# Patient Record
Sex: FEMALE | HISPANIC OR LATINO | Employment: FULL TIME | URBAN - METROPOLITAN AREA
[De-identification: names, ages, dates, MRNs, and addresses within clinical notes are randomized per-mention and may not be internally consistent; named-entity substitution may affect disease eponyms.]

---

## 2017-07-24 ENCOUNTER — COMMUNICATION - HEALTHEAST (OUTPATIENT)
Dept: FAMILY MEDICINE | Facility: CLINIC | Age: 52
End: 2017-07-24

## 2021-05-25 ENCOUNTER — RECORDS - HEALTHEAST (OUTPATIENT)
Dept: ADMINISTRATIVE | Facility: CLINIC | Age: 56
End: 2021-05-25

## 2022-11-01 ENCOUNTER — HOSPITAL ENCOUNTER (EMERGENCY)
Facility: CLINIC | Age: 57
Discharge: HOME | End: 2022-11-01
Attending: EMERGENCY MEDICINE | Admitting: EMERGENCY MEDICINE
Payer: COMMERCIAL

## 2022-11-01 ENCOUNTER — APPOINTMENT (OUTPATIENT)
Dept: MRI IMAGING | Facility: CLINIC | Age: 57
End: 2022-11-01
Attending: EMERGENCY MEDICINE
Payer: COMMERCIAL

## 2022-11-01 VITALS
TEMPERATURE: 97.2 F | DIASTOLIC BLOOD PRESSURE: 66 MMHG | HEART RATE: 78 BPM | OXYGEN SATURATION: 97 % | RESPIRATION RATE: 16 BRPM | BODY MASS INDEX: 29.95 KG/M2 | WEIGHT: 169 LBS | HEIGHT: 63 IN | SYSTOLIC BLOOD PRESSURE: 122 MMHG

## 2022-11-01 DIAGNOSIS — R51.9 HEADACHE, CHRONIC DAILY: ICD-10-CM

## 2022-11-01 LAB
ALBUMIN UR-MCNC: NEGATIVE MG/DL
ANION GAP SERPL CALCULATED.3IONS-SCNC: 8 MMOL/L (ref 5–18)
APPEARANCE UR: CLEAR
ATRIAL RATE - MUSE: 54 BPM
BASOPHILS # BLD AUTO: 0 10E3/UL (ref 0–0.2)
BASOPHILS NFR BLD AUTO: 0 %
BILIRUB UR QL STRIP: NEGATIVE
BUN SERPL-MCNC: 22 MG/DL (ref 8–22)
CALCIUM SERPL-MCNC: 9.6 MG/DL (ref 8.5–10.5)
CHLORIDE BLD-SCNC: 107 MMOL/L (ref 98–107)
CO2 SERPL-SCNC: 22 MMOL/L (ref 22–31)
COLOR UR AUTO: ABNORMAL
CREAT SERPL-MCNC: 0.77 MG/DL (ref 0.6–1.1)
DIASTOLIC BLOOD PRESSURE - MUSE: NORMAL MMHG
EOSINOPHIL # BLD AUTO: 0.2 10E3/UL (ref 0–0.7)
EOSINOPHIL NFR BLD AUTO: 2 %
ERYTHROCYTE [DISTWIDTH] IN BLOOD BY AUTOMATED COUNT: 13.5 % (ref 10–15)
FLUAV RNA SPEC QL NAA+PROBE: NEGATIVE
FLUBV RNA RESP QL NAA+PROBE: NEGATIVE
GFR SERPL CREATININE-BSD FRML MDRD: 89 ML/MIN/1.73M2
GLUCOSE BLD-MCNC: 97 MG/DL (ref 70–125)
GLUCOSE UR STRIP-MCNC: NEGATIVE MG/DL
HCT VFR BLD AUTO: 38.8 % (ref 35–47)
HGB BLD-MCNC: 13 G/DL (ref 11.7–15.7)
HGB UR QL STRIP: ABNORMAL
IMM GRANULOCYTES # BLD: 0 10E3/UL
IMM GRANULOCYTES NFR BLD: 0 %
INR PPP: 0.96 (ref 0.85–1.15)
INTERPRETATION ECG - MUSE: NORMAL
KETONES UR STRIP-MCNC: NEGATIVE MG/DL
LEUKOCYTE ESTERASE UR QL STRIP: ABNORMAL
LYMPHOCYTES # BLD AUTO: 3.8 10E3/UL (ref 0.8–5.3)
LYMPHOCYTES NFR BLD AUTO: 45 %
MCH RBC QN AUTO: 29.3 PG (ref 26.5–33)
MCHC RBC AUTO-ENTMCNC: 33.5 G/DL (ref 31.5–36.5)
MCV RBC AUTO: 88 FL (ref 78–100)
MONOCYTES # BLD AUTO: 0.7 10E3/UL (ref 0–1.3)
MONOCYTES NFR BLD AUTO: 9 %
MUCOUS THREADS #/AREA URNS LPF: PRESENT /LPF
NEUTROPHILS # BLD AUTO: 3.8 10E3/UL (ref 1.6–8.3)
NEUTROPHILS NFR BLD AUTO: 44 %
NITRATE UR QL: NEGATIVE
NRBC # BLD AUTO: 0 10E3/UL
NRBC BLD AUTO-RTO: 0 /100
P AXIS - MUSE: 65 DEGREES
PH UR STRIP: 5.5 [PH] (ref 5–7)
PLATELET # BLD AUTO: 292 10E3/UL (ref 150–450)
POTASSIUM BLD-SCNC: 3.8 MMOL/L (ref 3.5–5)
PR INTERVAL - MUSE: 166 MS
QRS DURATION - MUSE: 74 MS
QT - MUSE: 422 MS
QTC - MUSE: 400 MS
R AXIS - MUSE: 70 DEGREES
RBC # BLD AUTO: 4.43 10E6/UL (ref 3.8–5.2)
RBC URINE: 4 /HPF
RSV RNA SPEC NAA+PROBE: NEGATIVE
SARS-COV-2 RNA RESP QL NAA+PROBE: NEGATIVE
SODIUM SERPL-SCNC: 137 MMOL/L (ref 136–145)
SP GR UR STRIP: 1.02 (ref 1–1.03)
SQUAMOUS EPITHELIAL: <1 /HPF
SYSTOLIC BLOOD PRESSURE - MUSE: NORMAL MMHG
T AXIS - MUSE: 48 DEGREES
TROPONIN I SERPL-MCNC: <0.01 NG/ML (ref 0–0.29)
UROBILINOGEN UR STRIP-MCNC: <2 MG/DL
VENTRICULAR RATE- MUSE: 54 BPM
WBC # BLD AUTO: 8.5 10E3/UL (ref 4–11)
WBC URINE: 3 /HPF

## 2022-11-01 PROCEDURE — 87637 SARSCOV2&INF A&B&RSV AMP PRB: CPT | Performed by: EMERGENCY MEDICINE

## 2022-11-01 PROCEDURE — C9803 HOPD COVID-19 SPEC COLLECT: HCPCS

## 2022-11-01 PROCEDURE — 36415 COLL VENOUS BLD VENIPUNCTURE: CPT | Performed by: EMERGENCY MEDICINE

## 2022-11-01 PROCEDURE — 84484 ASSAY OF TROPONIN QUANT: CPT | Performed by: EMERGENCY MEDICINE

## 2022-11-01 PROCEDURE — 81001 URINALYSIS AUTO W/SCOPE: CPT | Performed by: EMERGENCY MEDICINE

## 2022-11-01 PROCEDURE — 85025 COMPLETE CBC W/AUTO DIFF WBC: CPT | Performed by: EMERGENCY MEDICINE

## 2022-11-01 PROCEDURE — 80048 BASIC METABOLIC PNL TOTAL CA: CPT | Performed by: EMERGENCY MEDICINE

## 2022-11-01 PROCEDURE — 70551 MRI BRAIN STEM W/O DYE: CPT

## 2022-11-01 PROCEDURE — 99285 EMERGENCY DEPT VISIT HI MDM: CPT | Mod: 25

## 2022-11-01 PROCEDURE — 85610 PROTHROMBIN TIME: CPT | Performed by: EMERGENCY MEDICINE

## 2022-11-01 PROCEDURE — 93005 ELECTROCARDIOGRAM TRACING: CPT | Performed by: EMERGENCY MEDICINE

## 2022-11-01 ASSESSMENT — ACTIVITIES OF DAILY LIVING (ADL)
ADLS_ACUITY_SCORE: 35
ADLS_ACUITY_SCORE: 35

## 2022-11-01 NOTE — ED TRIAGE NOTES
Pt reports that since about 0100 she went to [facility] for c/o headache and left sided weakness. The writer did a quick neuro assessment with the pt. No facial droop. NO bilateral facial weakness. Strong hand grasp and shoulder shrug. No noted pronator drift. The writer then spoke with Dr Zhou to come rule out stroke.   Pt further noted she has had the headache for a few days but had a CT @ [facility] last Tuesday or Wednesday. Pt states the CT was normal per My Chart. Pt was at [facility] tonight but LWBS when they were told that there was a 6 hour wait. Pt took 1500 mg of Tylenol at 2130 tonight.  Dr Zhou stated there was no stroke code.      Triage Assessment     Row Name 11/01/22 0404       Triage Assessment (Adult)    Airway WDL WDL       Respiratory WDL    Respiratory WDL WDL       Skin Circulation/Temperature WDL    Skin Circulation/Temperature WDL WDL       Cardiac WDL    Cardiac WDL WDL       Peripheral/Neurovascular WDL    Peripheral Neurovascular WDL WDL       Cognitive/Neuro/Behavioral WDL    Cognitive/Neuro/Behavioral WDL WDL

## 2022-11-01 NOTE — ED PROVIDER NOTES
EMERGENCY DEPARTMENT ENCOUnter      NAME: Rekha Deshpande  AGE: 57 year old female  YOB: 1965  MRN: [id number]  EVALUATION DATE & TIME: No admission date for patient encounter.    PCP: No primary care provider on file.    ED PROVIDER: Jihan Valles MD      Chief Complaint   Patient presents with     Headache     One-sided Weakness     left         FINAL IMPRESSION:  1. Headache, chronic daily          ED COURSE & MEDICAL DECISION MAKING:      In summary, the patient is a 57-year-old female that presents to the emergency department for evaluation of headache and left sided weakness.  Her neuro exam is normal and her symptoms have been ongoing for some time, so I think it is unlikely CVA.  We will obtain MRI to evaluate her chronic headache and weakness.  Symptoms could be secondary to tumor, stress, electrolyte abnormalities.    Reviewed previous medical records.  Offered pain medication which the patient declined.  0615-signed out at change of shift with brain mri pending.  If normal, plan is for discharge.      At the conclusion of the encounter I discussed the results of all of the tests and the disposition. The questions were answered. The patient or family acknowledged understanding and was agreeable with the care plan.         MEDICATIONS GIVEN IN THE EMERGENCY:  Medications - No data to display    NEW PRESCRIPTIONS STARTED AT TODAY'S ER VISIT  New Prescriptions    No medications on file          =================================================================    HPI        Rekha Deshpande is a 57 year old female that presents to the emergency department for evaluation of headache and left-sided weakness.  She had weakness that started yesterday and feels like it still present.  She has had headaches for the past month and has seen her primary care provider with a normal CT obtained.  Her primary care provider told her to come to the emergency department if she developed any weakness or  "worsening headache.      REVIEW OF SYSTEMS     Constitutional:  Denies fever or chills  HENT:  Denies sore throat   Respiratory:  Denies cough or shortness of breath   Cardiovascular:  Denies chest pain or palpitations  GI:  Denies abdominal pain, nausea, or vomiting  Musculoskeletal:  Denies any new extremity pain   Skin:  Denies rash   Neurologic:   headache, left sided upper and lower extremity weakness  All other systems reviewed and are negative      PAST MEDICAL HISTORY:  Reviewed and nothing pertinent  PAST SURGICAL HISTORY:  Past Surgical History:   Procedure Laterality Date     NO PAST SURGERIES             CURRENT MEDICATIONS:    No current outpatient medications on file.      ALLERGIES:  No Known Allergies    FAMILY HISTORY:  Family History   Problem Relation Age of Onset     Diabetes Type 2  Mother 66.00     Breast Cancer Maternal Aunt        SOCIAL HISTORY:   Social History     Socioeconomic History     Marital status:      Number of children: 4   Tobacco Use     Smoking status: Never     Smokeless tobacco: Never     Tobacco comments:     No exposure   Substance and Sexual Activity     Alcohol use: No     Drug use: No     Sexual activity: Yes     Partners: Male   Social History Narrative    , 4 adult children.   Kade has hx of alcohol abuse.  Member of [employer]       VITALS:  Patient Vitals for the past 24 hrs:   BP Temp Temp src Pulse Resp SpO2 Height Weight   11/01/22 0402 118/58 97.6  F (36.4  C) Temporal 81 18 97 % 1.6 m (5' 3\") 76.7 kg (169 lb)       PHYSICAL EXAM    Constitutional:  Well developed, Well nourished,  HENT:  Normocephalic, Atraumatic, Bilateral external ears normal, Oropharynx moist, Nose normal.   Neck:  Normal range of motion, No meningismus, No stridor.   Eyes:  EOMI, Conjunctiva normal, No discharge.   Respiratory:  Normal breath sounds, No respiratory distress, No wheezing, No chest tenderness.   Cardiovascular:  Normal heart rate, Normal " rhythm, No murmurs  GI:  Soft, No tenderness, No guarding,   Musculoskeletal:  Neurovascularly intact distally, No edema, No tenderness, No cyanosis, Good range of motion in all major joints.  Integument:  Warm, Dry, No erythema, No rash.   Lymphatic:  No lymphadenopathy noted.   Neurologic:  Alert & oriented x 3, Normal motor function, Normal sensory function, No focal deficits noted.   [employer] Stroke Scale  Exam Interval: Baseline   Score    Level of consciousness: (0)   Alert, keenly responsive    LOC questions: (0)   Answers both questions correctly    LOC commands: (0)   Performs both tasks correctly    Best gaze: (0)   Normal    Visual: (0)   No visual loss    Facial palsy: (0)   Normal symmetrical movements    Motor arm (left): (0)   No drift    Motor arm (right): (0)   No drift    Motor leg (left): (0)   No drift    Motor leg (right): (0)   No drift    Limb ataxia: (0)   Absent    Sensory: (0)   Normal- no sensory loss    Best language: (0)   Normal- no aphasia    Dysarthria: (0)   Normal    Extinction and inattention: (0)   No abnormality        Total Score:  0       Psychiatric:  Affect normal, Judgment normal, Mood normal.      LAB:  All pertinent labs reviewed and interpreted.  Results for orders placed or performed during the hospital encounter of 11/01/22   Result Value Ref Range    INR 0.96 0.85 - 1.15   Basic metabolic panel   Result Value Ref Range    Sodium 137 136 - 145 mmol/L    Potassium 3.8 3.5 - 5.0 mmol/L    Chloride 107 98 - 107 mmol/L    Carbon Dioxide (CO2) 22 22 - 31 mmol/L    Anion Gap 8 5 - 18 mmol/L    Urea Nitrogen 22 8 - 22 mg/dL    Creatinine 0.77 0.60 - 1.10 mg/dL    Calcium 9.6 8.5 - 10.5 mg/dL    Glucose 97 70 - 125 mg/dL    GFR Estimate 89 >60 mL/min/1.73m2   Result Value Ref Range    Troponin I <0.01 0.00 - 0.29 ng/mL   CBC with platelets and differential   Result Value Ref Range    WBC Count 8.5 4.0 - 11.0 10e3/uL    RBC Count 4.43 3.80 - 5.20 10e6/uL     Hemoglobin 13.0 11.7 - 15.7 g/dL    Hematocrit 38.8 35.0 - 47.0 %    MCV 88 78 - 100 fL    MCH 29.3 26.5 - 33.0 pg    MCHC 33.5 31.5 - 36.5 g/dL    RDW 13.5 10.0 - 15.0 %    Platelet Count 292 150 - 450 10e3/uL    % Neutrophils 44 %    % Lymphocytes 45 %    % Monocytes 9 %    % Eosinophils 2 %    % Basophils 0 %    % Immature Granulocytes 0 %    NRBCs per 100 WBC 0 <1 /100    Absolute Neutrophils 3.8 1.6 - 8.3 10e3/uL    Absolute Lymphocytes 3.8 0.8 - 5.3 10e3/uL    Absolute Monocytes 0.7 0.0 - 1.3 10e3/uL    Absolute Eosinophils 0.2 0.0 - 0.7 10e3/uL    Absolute Basophils 0.0 0.0 - 0.2 10e3/uL    Absolute Immature Granulocytes 0.0 <=0.4 10e3/uL    Absolute NRBCs 0.0 10e3/uL   ECG 12-LEAD WITH MUSE (LHE)   Result Value Ref Range    Systolic Blood Pressure  mmHg    Diastolic Blood Pressure  mmHg    Ventricular Rate 54 BPM    Atrial Rate 54 BPM    ND Interval 166 ms    QRS Duration 74 ms     ms    QTc 400 ms    P Axis 65 degrees    R AXIS 70 degrees    T Axis 48 degrees    Interpretation ECG       Sinus bradycardia  Low voltage QRS  Borderline ECG  When compared with ECG of 05-OCT-1997 22:55,  T wave amplitude has decreased in Anterior leads  Confirmed by SEE ED PROVIDER NOTE FOR, ECG INTERPRETATION ([id number]),  Mikhail Espinosa ([id number]) on 2022 5:02:13 AM         RADIOLOGY:    MR Brain w/o Contrast    (Results Pending)       [id number], sinus, no st segment elevation or depression    I have independently reviewed and interpreted this EKG        Jihan Valles MD  Emergency Medicine  [facility] EMERGENCY ROOM  [id number] [address] [phone]  Dept: [phone]       Jihan Valles MD  22 0624

## 2022-11-01 NOTE — ED NOTES
Pt able to go to the bathroom. UA sent. Pt went to MRI. No new neuro findings based on triage assessment.

## 2022-11-01 NOTE — ED NOTES
EMERGENCY DEPARTMENT SIGN OUT NOTE        ED COURSE AND MEDICAL DECISION MAKING  Patient was signed out to me by Dr Jihan Valles at 6:15 AM  8:13 AM Rechecked and updated the patient. Patients neurological symptoms improved. I did a repeat neural exam non focal. We discussed the plan for discharge and the patient is agreeable. Reviewed supportive cares, symptomatic treatment, outpatient follow up, and reasons to return to the Emergency Department. Patient to be discharged by ED RN.     In brief, Rekha Deshpande is a 57 year old female who initially presented ***     At time of sign out, disposition was pending labs and brain MRI     ED Course as of 11/01/22 0817   Tue Nov 01, 2022   0737 MR Brain w/o Contrast         FINAL IMPRESSION    1. Headache, chronic daily        ED MEDS  Medications - No data to display    LAB  Labs Ordered and Resulted from Time of ED Arrival to Time of ED Departure   ROUTINE UA WITH MICROSCOPIC REFLEX TO CULTURE - Abnormal       Result Value    Color Urine Light Yellow      Appearance Urine Clear      Glucose Urine Negative      Bilirubin Urine Negative      Ketones Urine Negative      Specific Gravity Urine 1.021      Blood Urine 0.5 mg/dL (*)     pH Urine 5.5      Protein Albumin Urine Negative      Urobilinogen Urine <2.0      Nitrite Urine Negative      Leukocyte Esterase Urine 75 Josie/uL (*)     Mucus Urine Present (*)     RBC Urine 4 (*)     WBC Urine 3      Squamous Epithelials Urine <1     INR - Normal    INR 0.96     BASIC METABOLIC PANEL - Normal    Sodium 137      Potassium 3.8      Chloride 107      Carbon Dioxide (CO2) 22      Anion Gap 8      Urea Nitrogen 22      Creatinine 0.77      Calcium 9.6      Glucose 97      GFR Estimate 89     TROPONIN I - Normal    Troponin I <0.01     INFLUENZA A/B & SARS-COV2 PCR MULTIPLEX - Normal    Influenza A PCR Negative      Influenza B PCR Negative      RSV PCR Negative      SARS CoV2 PCR Negative     CBC WITH PLATELETS AND  DIFFERENTIAL    WBC Count 8.5      RBC Count 4.43      Hemoglobin 13.0      Hematocrit 38.8      MCV 88      MCH 29.3      MCHC 33.5      RDW 13.5      Platelet Count 292      % Neutrophils 44      % Lymphocytes 45      % Monocytes 9      % Eosinophils 2      % Basophils 0      % Immature Granulocytes 0      NRBCs per 100 WBC 0      Absolute Neutrophils 3.8      Absolute Lymphocytes 3.8      Absolute Monocytes 0.7      Absolute Eosinophils 0.2      Absolute Basophils 0.0      Absolute Immature Granulocytes 0.0      Absolute NRBCs 0.0         EKG  ***    RADIOLOGY    MR Brain w/o Contrast   Final Result   IMPRESSION:   1.  No acute intracranial process or significant interval change. Specifically, no acute infarct, focal mass lesion, or acute intracranial hemorrhage.          DISCHARGE MEDS  New Prescriptions    No medications on file       [facility] EMERGENCY ROOM  [address] [address] [phone]